# Patient Record
Sex: FEMALE | Race: BLACK OR AFRICAN AMERICAN | Employment: UNEMPLOYED | URBAN - METROPOLITAN AREA
[De-identification: names, ages, dates, MRNs, and addresses within clinical notes are randomized per-mention and may not be internally consistent; named-entity substitution may affect disease eponyms.]

---

## 2022-03-31 ENCOUNTER — HOSPITAL ENCOUNTER (EMERGENCY)
Age: 67
Discharge: HOME | End: 2022-03-31
Attending: EMERGENCY MEDICINE

## 2022-03-31 ENCOUNTER — APPOINTMENT (OUTPATIENT)
Dept: GENERAL RADIOLOGY | Age: 67
End: 2022-03-31

## 2022-03-31 VITALS
OXYGEN SATURATION: 99 % | TEMPERATURE: 98.2 F | DIASTOLIC BLOOD PRESSURE: 91 MMHG | HEART RATE: 65 BPM | RESPIRATION RATE: 18 BRPM | SYSTOLIC BLOOD PRESSURE: 162 MMHG

## 2022-03-31 DIAGNOSIS — M25.561 ACUTE PAIN OF RIGHT KNEE: Primary | ICD-10-CM

## 2022-03-31 PROCEDURE — 6360000002 HC RX W HCPCS: Performed by: EMERGENCY MEDICINE

## 2022-03-31 PROCEDURE — 99284 EMERGENCY DEPT VISIT MOD MDM: CPT

## 2022-03-31 PROCEDURE — 73562 X-RAY EXAM OF KNEE 3: CPT

## 2022-03-31 PROCEDURE — 96372 THER/PROPH/DIAG INJ SC/IM: CPT

## 2022-03-31 RX ORDER — NAPROXEN 500 MG/1
500 TABLET ORAL 2 TIMES DAILY PRN
Qty: 20 TABLET | Refills: 0 | Status: SHIPPED | OUTPATIENT
Start: 2022-03-31

## 2022-03-31 RX ORDER — KETOROLAC TROMETHAMINE 30 MG/ML
30 INJECTION, SOLUTION INTRAMUSCULAR; INTRAVENOUS ONCE
Status: COMPLETED | OUTPATIENT
Start: 2022-03-31 | End: 2022-03-31

## 2022-03-31 RX ADMIN — KETOROLAC TROMETHAMINE 30 MG: 30 INJECTION, SOLUTION INTRAMUSCULAR; INTRAVENOUS at 02:27

## 2022-03-31 ASSESSMENT — PAIN SCALES - GENERAL
PAINLEVEL_OUTOF10: 8
PAINLEVEL_OUTOF10: 8

## 2022-03-31 ASSESSMENT — ENCOUNTER SYMPTOMS
VOMITING: 0
RESPIRATORY NEGATIVE: 1
EYES NEGATIVE: 1
ABDOMINAL PAIN: 1
NAUSEA: 0
DIARRHEA: 0

## 2022-03-31 ASSESSMENT — PAIN DESCRIPTION - PROGRESSION: CLINICAL_PROGRESSION: NOT CHANGED

## 2022-03-31 ASSESSMENT — PAIN DESCRIPTION - FREQUENCY: FREQUENCY: CONTINUOUS

## 2022-03-31 ASSESSMENT — PAIN - FUNCTIONAL ASSESSMENT
PAIN_FUNCTIONAL_ASSESSMENT: 0-10
PAIN_FUNCTIONAL_ASSESSMENT: PREVENTS OR INTERFERES SOME ACTIVE ACTIVITIES AND ADLS

## 2022-03-31 ASSESSMENT — PAIN DESCRIPTION - ONSET: ONSET: PROGRESSIVE

## 2022-03-31 ASSESSMENT — PAIN DESCRIPTION - ORIENTATION: ORIENTATION: RIGHT

## 2022-03-31 ASSESSMENT — PAIN DESCRIPTION - DESCRIPTORS: DESCRIPTORS: SORE

## 2022-03-31 ASSESSMENT — PAIN DESCRIPTION - PAIN TYPE: TYPE: ACUTE PAIN

## 2022-03-31 ASSESSMENT — PAIN DESCRIPTION - LOCATION: LOCATION: KNEE

## 2022-03-31 NOTE — ED PROVIDER NOTES
[address] [facility]          ATTENDING PHYSICIAN NOTE       Date of evaluation: 3/31/2022    Chief Complaint     Knee Pain      History of Present Illness     Mariana Queen is a 78 y.o. female who presents to the emergency department complaining of right knee pain. Patient is from [place] and speaks primarily [place] so history is obtained through an , though there was some difficulty in communication between the patient and the . Patient states that her pain started approximately 5 hours prior to presentation. She denies any trauma. She tried taking Aleve without improvement of her symptoms. She denies having pain like this in the past.  She states she is having difficulty walking secondary to the pain. Patient does note pain in her abdomen but states that this is chronic for her and is unchanged from baseline. Review of Systems     Review of Systems   Constitutional: Negative. HENT: Negative. Eyes: Negative. Respiratory: Negative. Cardiovascular: Negative. Gastrointestinal: Positive for abdominal pain. Negative for diarrhea, nausea and vomiting. Genitourinary: Negative. Musculoskeletal: Positive for arthralgias. Neurological: Negative. All other systems reviewed and are negative. Review of systems is limited secondary to language barrier. Past Medical, Surgical, Family, and Social History     She has a past medical history of Hypertension. She has no past surgical history on file. Her family history is not on file. She reports that she has never smoked. She does not have any smokeless tobacco history on file. She reports previous alcohol use. She reports previous drug use.     Medications     Discharge Medication List as of 3/31/2022  4:20 AM      CONTINUE these medications which have NOT CHANGED    Details   bismuth subsalicylate (PEPTO BISMOL) 262 MG/15ML suspension Take by mouthHistorical Med      NONFORMULARY Blood pressure medication from [address]     She is allergic to aspirin. Physical Exam     INITIAL VITALS: BP: (!) 162/91, Temp: 98.2 °F (36.8 °C), Pulse: 65, Resp: 18, SpO2: 99 %   Physical Exam  Vitals and nursing note reviewed. Constitutional:       General: She is not in acute distress. Cardiovascular:      Pulses: Normal pulses. Musculoskeletal:      Right knee: Swelling present. No effusion, erythema or crepitus. Tenderness present over the lateral joint line. Comments: Patient has tenderness to palpation along the medial lateral joint lines. There is no tenderness over the patella or the proximal tibia. There is no tenderness at the insertion of the patellar tendon. Patient is able to range the knee but does complain of pain on full extension. There is swelling but no obvious joint effusion and no warmth noted. Neurological:      Mental Status: She is alert. Diagnostic Results     RADIOLOGY:  XR KNEE RIGHT (3 VIEWS)   Final Result     No acute osseous abnormalities. RECENT VITALS:  BP: (!) 162/91,Temp: 98.2 °F (36.8 °C), Pulse: 65, Resp: 18, SpO2: 99 %     Procedures     N/A    ED Course     Nursing Notes, Past Medical Hx, Past Surgical Hx, Social Hx,Allergies, and Family Hx were reviewed. patient was given the following medications:  Orders Placed This Encounter   Medications    ketorolac (TORADOL) injection 30 mg    naproxen (NAPROSYN) 500 MG tablet     Sig: Take 1 tablet by mouth 2 times daily as needed for Pain     Dispense:  20 tablet     Refill:  0       CONSULTS:  None    MEDICAL DECISIONMAKING / ASSESSMENT / PLAN     Kylie Reeder is a 78 y.o. female presents complaining of acute onset of right knee pain. Patient states her pain started approximately 5 hours prior to presentation without any prodromal symptoms. Patient denies any trauma. On exam, patient does have swelling to the knee but no obvious joint effusion.   X-ray demonstrates no osseous abnormalities. I feel likely the patient has soft tissue injury. I have low suspicion for septic arthritis based on the physical exam so do not feel an arthrocentesis is indicated. Patient will be treated with anti-inflammatories. She will be instructed to establish primary care and was given the contact information for the [facility] resident clinic. Clinical Impression     1.  Acute pain of right knee        Disposition     PATIENT REFERRED TO:  [place] [id number]  [phone]            DISCHARGE MEDICATIONS:  Discharge Medication List as of 3/31/2022  4:20 AM      START taking these medications    Details   naproxen (NAPROSYN) 500 MG tablet Take 1 tablet by mouth 2 times daily as needed for Pain, Disp-20 tablet, R-[id number]             DISPOSITION          Isiah Wood MD  03/31/22 [id number]

## 2022-03-31 NOTE — ED NOTES
Patient self ambulated to the restroom with crutches. No issues observed.      Chuy Zuñiga RN  03/31/22 1144

## 2022-03-31 NOTE — ED NOTES
Discharge instructions provided to patient by RN at bedside. No further concerns addressed at this time.      Anurag Sánchez RN  03/31/22 6803